# Patient Record
Sex: MALE | Race: WHITE
[De-identification: names, ages, dates, MRNs, and addresses within clinical notes are randomized per-mention and may not be internally consistent; named-entity substitution may affect disease eponyms.]

---

## 2019-08-20 ENCOUNTER — HOSPITAL ENCOUNTER (OUTPATIENT)
Dept: HOSPITAL 93 - RAD | Age: 69
Discharge: HOME | End: 2019-08-20
Payer: COMMERCIAL

## 2019-08-20 DIAGNOSIS — Z48.01: Primary | ICD-10-CM

## 2019-08-20 DIAGNOSIS — Z48.89: ICD-10-CM

## 2020-08-09 ENCOUNTER — HOSPITAL ENCOUNTER (INPATIENT)
Dept: HOSPITAL 93 - ER | Age: 70
LOS: 11 days | Discharge: NURSING, REHAB OR LONG-TERM CARE | DRG: 69 | End: 2020-08-20
Attending: INTERNAL MEDICINE | Admitting: INTERNAL MEDICINE
Payer: COMMERCIAL

## 2020-08-09 VITALS — WEIGHT: 120 LBS | BODY MASS INDEX: 17.77 KG/M2 | HEIGHT: 69 IN

## 2020-08-09 DIAGNOSIS — K70.30: ICD-10-CM

## 2020-08-09 DIAGNOSIS — E86.0: ICD-10-CM

## 2020-08-09 DIAGNOSIS — J18.9: ICD-10-CM

## 2020-08-09 DIAGNOSIS — R64: ICD-10-CM

## 2020-08-09 DIAGNOSIS — Z03.818: ICD-10-CM

## 2020-08-09 DIAGNOSIS — G31.89: ICD-10-CM

## 2020-08-09 DIAGNOSIS — K57.30: ICD-10-CM

## 2020-08-09 DIAGNOSIS — F10.10: ICD-10-CM

## 2020-08-09 DIAGNOSIS — I67.82: Primary | ICD-10-CM

## 2020-08-09 DIAGNOSIS — R13.12: ICD-10-CM

## 2020-08-09 DIAGNOSIS — E87.0: ICD-10-CM

## 2020-08-09 DIAGNOSIS — K76.0: ICD-10-CM

## 2020-08-09 DIAGNOSIS — F44.89: ICD-10-CM

## 2020-08-09 PROCEDURE — 70552 MRI BRAIN STEM W/DYE: CPT

## 2020-08-10 PROCEDURE — B030YZZ MAGNETIC RESONANCE IMAGING (MRI) OF BRAIN USING OTHER CONTRAST: ICD-10-PCS | Performed by: INTERNAL MEDICINE

## 2020-08-10 PROCEDURE — 0T9B70Z DRAINAGE OF BLADDER WITH DRAINAGE DEVICE, VIA NATURAL OR ARTIFICIAL OPENING: ICD-10-PCS | Performed by: INTERNAL MEDICINE

## 2020-08-10 PROCEDURE — BW40ZZZ ULTRASONOGRAPHY OF ABDOMEN: ICD-10-PCS | Performed by: INTERNAL MEDICINE

## 2020-08-11 PROCEDURE — BW21Y0Z COMPUTERIZED TOMOGRAPHY (CT SCAN) OF ABDOMEN AND PELVIS USING OTHER CONTRAST, UNENHANCED AND ENHANCED: ICD-10-PCS | Performed by: INTERNAL MEDICINE

## 2020-08-11 PROCEDURE — BB24Y0Z COMPUTERIZED TOMOGRAPHY (CT SCAN) OF BILATERAL LUNGS USING OTHER CONTRAST, UNENHANCED AND ENHANCED: ICD-10-PCS | Performed by: INTERNAL MEDICINE

## 2020-08-13 PROCEDURE — 0DH67UZ INSERTION OF FEEDING DEVICE INTO STOMACH, VIA NATURAL OR ARTIFICIAL OPENING: ICD-10-PCS | Performed by: INTERNAL MEDICINE

## 2020-08-13 PROCEDURE — 4A12X4Z MONITORING OF CARDIAC ELECTRICAL ACTIVITY, EXTERNAL APPROACH: ICD-10-PCS | Performed by: INTERNAL MEDICINE

## 2020-08-13 PROCEDURE — 3E0G76Z INTRODUCTION OF NUTRITIONAL SUBSTANCE INTO UPPER GI, VIA NATURAL OR ARTIFICIAL OPENING: ICD-10-PCS | Performed by: INTERNAL MEDICINE

## 2020-08-13 PROCEDURE — 8E0ZXY6 ISOLATION: ICD-10-PCS | Performed by: INTERNAL MEDICINE

## 2020-09-26 ENCOUNTER — HOSPITAL ENCOUNTER (INPATIENT)
Dept: HOSPITAL 93 - ER | Age: 70
LOS: 6 days | DRG: 177 | End: 2020-10-02
Attending: INTERNAL MEDICINE | Admitting: INTERNAL MEDICINE
Payer: COMMERCIAL

## 2020-09-26 VITALS — WEIGHT: 190 LBS | BODY MASS INDEX: 28.14 KG/M2 | HEIGHT: 69 IN

## 2020-09-26 DIAGNOSIS — E87.0: ICD-10-CM

## 2020-09-26 DIAGNOSIS — E87.5: ICD-10-CM

## 2020-09-26 DIAGNOSIS — J12.89: ICD-10-CM

## 2020-09-26 DIAGNOSIS — I63.89: ICD-10-CM

## 2020-09-26 DIAGNOSIS — R09.02: ICD-10-CM

## 2020-09-26 DIAGNOSIS — K70.9: ICD-10-CM

## 2020-09-26 DIAGNOSIS — G40.89: ICD-10-CM

## 2020-09-26 DIAGNOSIS — U07.1: Primary | ICD-10-CM

## 2020-09-26 DIAGNOSIS — E03.9: ICD-10-CM

## 2020-09-26 DIAGNOSIS — R65.21: ICD-10-CM

## 2020-09-26 DIAGNOSIS — E03.8: ICD-10-CM

## 2020-09-26 DIAGNOSIS — E86.0: ICD-10-CM

## 2020-09-26 DIAGNOSIS — F01.50: ICD-10-CM

## 2020-09-26 DIAGNOSIS — R31.0: ICD-10-CM

## 2020-09-26 DIAGNOSIS — J69.0: ICD-10-CM

## 2020-09-26 DIAGNOSIS — A41.9: ICD-10-CM

## 2020-09-26 PROCEDURE — 4A12X4Z MONITORING OF CARDIAC ELECTRICAL ACTIVITY, EXTERNAL APPROACH: ICD-10-PCS | Performed by: INTERNAL MEDICINE

## 2020-09-26 PROCEDURE — 8E0ZXY6 ISOLATION: ICD-10-PCS | Performed by: INTERNAL MEDICINE

## 2020-09-26 PROCEDURE — BW28ZZZ COMPUTERIZED TOMOGRAPHY (CT SCAN) OF HEAD: ICD-10-PCS | Performed by: RADIOLOGY

## 2020-09-26 NOTE — NUR
SE RECIBE DE TURNO ANTERIOR MASCULINO DE 70 ANOS,ALERTA,DESORIENTADO EN CARLOS
ESFERAS. UBICADO EN AREA DE CHEST PAIN,SABA #16. CONECTADO A MONITOR
CARDIACO,OXIMETRIA DE PULSO CONTINUA. SATURANDO %. RESTRINGUIDO POR 4.
AREAS SE OBSERVAN LIBRES DE S/S EDEMA. PENDIENTE CONSULTA CON DR.MUNOZ MOREJON.

## 2020-09-27 PROCEDURE — 4A033R1 MEASUREMENT OF ARTERIAL SATURATION, PERIPHERAL, PERCUTANEOUS APPROACH: ICD-10-PCS | Performed by: INTERNAL MEDICINE

## 2020-09-27 PROCEDURE — 3E0F7SF INTRODUCTION OF OTHER GAS INTO RESPIRATORY TRACT, VIA NATURAL OR ARTIFICIAL OPENING: ICD-10-PCS | Performed by: INTERNAL MEDICINE

## 2020-09-28 PROCEDURE — 3E0F7GC INTRODUCTION OF OTHER THERAPEUTIC SUBSTANCE INTO RESPIRATORY TRACT, VIA NATURAL OR ARTIFICIAL OPENING: ICD-10-PCS | Performed by: INTERNAL MEDICINE
